# Patient Record
Sex: MALE
[De-identification: names, ages, dates, MRNs, and addresses within clinical notes are randomized per-mention and may not be internally consistent; named-entity substitution may affect disease eponyms.]

---

## 2022-04-28 PROBLEM — Z00.00 ENCOUNTER FOR PREVENTIVE HEALTH EXAMINATION: Status: ACTIVE | Noted: 2022-04-28

## 2022-05-24 ENCOUNTER — APPOINTMENT (OUTPATIENT)
Dept: PEDIATRIC ORTHOPEDIC SURGERY | Facility: CLINIC | Age: 22
End: 2022-05-24
Payer: COMMERCIAL

## 2022-05-24 DIAGNOSIS — M41.126 ADOLESCENT IDIOPATHIC SCOLIOSIS, LUMBAR REGION: ICD-10-CM

## 2022-05-24 DIAGNOSIS — M41.125 ADOLESCENT IDIOPATHIC SCOLIOSIS, THORACOLUMBAR REGION: ICD-10-CM

## 2022-05-24 PROCEDURE — 99204 OFFICE O/P NEW MOD 45 MIN: CPT | Mod: 25

## 2022-05-24 PROCEDURE — 72082 X-RAY EXAM ENTIRE SPI 2/3 VW: CPT

## 2022-05-26 PROBLEM — M41.126 ADOLESCENT IDIOPATHIC SCOLIOSIS OF LUMBAR SPINE: Status: ACTIVE | Noted: 2022-05-26

## 2022-05-26 PROBLEM — M41.125 ADOLESCENT IDIOPATHIC SCOLIOSIS OF THORACOLUMBAR SPINE: Status: ACTIVE | Noted: 2022-05-26

## 2022-05-26 NOTE — ASSESSMENT
[FreeTextEntry1] : 22 year old male with scoliosis, 35 degrees left lumbar curve. Risser 5. Quesada 8.\par \par Today's assessment was performed with the assistance of the patient's mother as an independent historian. Clinical findings and x-ray results were reviewed at length. We discussed at length the natural history, etiology, pathoanatomy and treatment modalities of scoliosis with patient and parent. Explained to patient and parent that for curves measuring 25 degrees, a brace regimen is typically implemented for treatment. For curves of 40 degrees or more, surgical intervention is warranted. It was discussed with the family that based of recent literature, there is a slight chance of progression of the curvature given that the curve measures greater than 30 degrees and due to being lumbar in length. However, given that the patient is no pain and is doing well, we will continue with close surveillance at this time. No bracing interventions were deemed necessary. We will plan to see GODFREY back in clinic in approximately 6 months for repeat Scoliosis series x-rays and for reevaluation. All questions were answered. The family understood the treatment plan. \par \par Documented by  Beth Arguelles, acted as a scribe for Dr. Plascencia on this date 05/24/2022.\par \par The above documentation completed by the scribe is an accurate record of both my words and actions.\par \par \par

## 2022-05-26 NOTE — REASON FOR VISIT
[Initial Evaluation] : an initial evaluation [Patient] : patient [Mother] : mother [FreeTextEntry1] : Spinal Asymmetry Evaluation

## 2022-05-26 NOTE — DATA REVIEWED
[de-identified] : My interpretation and review of images taken today, 05/24/2022 , in office:\par \par AP and Lateral spine radiographs were ordered, obtained, and reviewed today in clinic depicting a 35 degree left lumbar curvature from T12-L4. Risser 5. Dipak 8.

## 2022-05-26 NOTE — PHYSICAL EXAM
[FreeTextEntry1] : General: Patient is awake and alert and in no acute distress.  Well developed, well nourished, cooperative, able to get on and off the bed with ease.		\par Skin: The skin is intact, warm, pink, and dry over the area examined. \par Eyes: normal tinted sclera, normal eyelids and pupils were equal and round. \par ENT: normal ears, normal nose and normal lips.\par Cardiovascular: There is brisk capillary refill in the digits of the affected extremity. They are symmetric pulses in the bilateral upper and lower extremities, positive peripheral pulses, brisk capillary refill, but no peripheral edema.\par Respiratory: The patient is in no apparent respiratory distress. They're taking full deep breaths without use of accessory muscles or evidence of audible wheezes or stridor without the use of a stethoscope, normal respiratory effort. \par Neurological: 5/5 motor strength in the main muscle groups of bilateral lower extremities, sensory intact in bilateral lower extremities. \par Musculoskeletal:\par Neurological examination reveals a grade 5/5 muscle power.  Sensation is intact to crude touch and pinprick.  Deep tendon reflexes are 1+ with ankle jerk and knee jerk.  The plantars are bilaterally down going.  Superficial abdominal reflexes are symmetric and intact.  The biceps and triceps reflexes are 1+.  The Seth test is negative.\par  \par There is no hairy patch, lipoma, sinus in the back.  There is no pes cavus, asymmetry of calves, significant leg length discrepancy or significant cafe-au-lait spots.\par  \par Examination of both the upper and lower extremities:\par No obvious abnormalities. 5/5 muscle strength bilaterally.  There is no gross deformity.  Patient has full range of motion of both the hips, knees, ankles, wrists, elbows, and shoulders.  Neck range of motion is full and free without any pain or spasm. Normal appearing fingers and toes. No large birthmarks noted. Abdominal reflexes in all 4 quadrants present. DTR's are intact.\par \par  On forward bending Briggs test, there is a left lumbar prominence. The patient is able to bend forward and touch the toes as well as bend backward without pain. Lateral flexion is symmetrical and is pain free. SLR test is free more than 70 degrees. Fabere's test is negative.\par

## 2022-05-26 NOTE — HISTORY OF PRESENT ILLNESS
[Stable] : stable [0] : currently ~his/her~ pain is 0 out of 10 [FreeTextEntry1] : 22 year old male presents 05/24/2022 with his mother for an initial evaluation of his spinal asymmetries. The patient reports that their pediatrician recently noticed spinal asymmetries and advised family to follow up with an orthopedist. He denies any back pain. The patient denies any recent fevers, chills or night sweats. Denies any recent trauma or injuries. He denies any radiating pain, numbness, tingling sensations, discomfort, weakness to the LE, radiating LE pain, or bladder/bowel dysfunction. He has been participating in all of his normal physical activities without restrictions or discomfort. He does report a family history of scoliosis with his sister, who was treated surgically by myself. \par \par The patient's HPI was reviewed thoroughly with patient and parent. The patient's parent has acted as an independent historian regarding the above information due to the unreliable nature of the history obtained from the patient.

## 2022-05-26 NOTE — REVIEW OF SYSTEMS
[Asthma] : asthma [Appropriate Age Development] : development appropriate for age [Change in Activity] : no change in activity [Fever Above 102] : no fever [Rash] : no rash [Itching] : no itching [Large Birth Marks] : no large birth marks [Nosebleeds] : no epistaxis [Heart Problems] : no heart problems [Murmur] : no murmur [Cough] : no cough [Shortness of Breath] : no shortness of breath [Limping] : no limping [Joint Pains] : no arthralgias [Joint Swelling] : no joint swelling [Back Pain] : ~T no back pain [Muscle Aches] : no muscle aches [Fainting] : no fainting [Thyroid Problems] : no thyroid problems [Diabetes] : no diabetese [Bleeding Problems] : no bleeding problems [Sickle Cell Disease] : no sickle cell disease [Smokers in Home] : no one in home smokes